# Patient Record
Sex: MALE | Race: WHITE | Employment: OTHER | URBAN - METROPOLITAN AREA
[De-identification: names, ages, dates, MRNs, and addresses within clinical notes are randomized per-mention and may not be internally consistent; named-entity substitution may affect disease eponyms.]

---

## 2020-07-20 PROBLEM — E66.01 SEVERE OBESITY (HCC): Status: ACTIVE | Noted: 2020-07-20

## 2021-04-03 ENCOUNTER — HOSPITAL ENCOUNTER (EMERGENCY)
Age: 76
Discharge: HOME | End: 2021-04-03
Attending: EMERGENCY MEDICINE
Payer: MEDICARE

## 2021-04-03 VITALS
HEART RATE: 79 BPM | DIASTOLIC BLOOD PRESSURE: 71 MMHG | WEIGHT: 248 LBS | SYSTOLIC BLOOD PRESSURE: 149 MMHG | TEMPERATURE: 97.8 F | HEIGHT: 70 IN | BODY MASS INDEX: 35.5 KG/M2 | RESPIRATION RATE: 18 BRPM | OXYGEN SATURATION: 94 %

## 2021-04-03 DIAGNOSIS — T78.3XXA ANGIOEDEMA, INITIAL ENCOUNTER: Primary | ICD-10-CM

## 2021-04-03 LAB
ABO + RH BLD: NORMAL
BLOOD GROUP ANTIBODIES SERPL: NORMAL
SPECIMEN EXP DATE BLD: NORMAL

## 2021-04-03 PROCEDURE — 74011000250 HC RX REV CODE- 250: Performed by: EMERGENCY MEDICINE

## 2021-04-03 PROCEDURE — P9059 PLASMA, FRZ BETWEEN 8-24HOUR: HCPCS

## 2021-04-03 PROCEDURE — 96375 TX/PRO/DX INJ NEW DRUG ADDON: CPT

## 2021-04-03 PROCEDURE — 36430 TRANSFUSION BLD/BLD COMPNT: CPT

## 2021-04-03 PROCEDURE — 74011250636 HC RX REV CODE- 250/636: Performed by: EMERGENCY MEDICINE

## 2021-04-03 PROCEDURE — 99284 EMERGENCY DEPT VISIT MOD MDM: CPT

## 2021-04-03 PROCEDURE — 86901 BLOOD TYPING SEROLOGIC RH(D): CPT

## 2021-04-03 PROCEDURE — 96374 THER/PROPH/DIAG INJ IV PUSH: CPT

## 2021-04-03 RX ORDER — SODIUM CHLORIDE 9 MG/ML
250 INJECTION, SOLUTION INTRAVENOUS AS NEEDED
Status: DISCONTINUED | OUTPATIENT
Start: 2021-04-03 | End: 2021-04-03 | Stop reason: HOSPADM

## 2021-04-03 RX ORDER — DEXAMETHASONE SODIUM PHOSPHATE 100 MG/10ML
10 INJECTION INTRAMUSCULAR; INTRAVENOUS
Status: COMPLETED | OUTPATIENT
Start: 2021-04-03 | End: 2021-04-03

## 2021-04-03 RX ORDER — DIPHENHYDRAMINE HYDROCHLORIDE 50 MG/ML
50 INJECTION, SOLUTION INTRAMUSCULAR; INTRAVENOUS
Status: COMPLETED | OUTPATIENT
Start: 2021-04-03 | End: 2021-04-03

## 2021-04-03 RX ADMIN — FAMOTIDINE 20 MG: 10 INJECTION INTRAVENOUS at 02:25

## 2021-04-03 RX ADMIN — DIPHENHYDRAMINE HYDROCHLORIDE 50 MG: 50 INJECTION, SOLUTION INTRAMUSCULAR; INTRAVENOUS at 02:25

## 2021-04-03 RX ADMIN — DEXAMETHASONE SODIUM PHOSPHATE 10 MG: 10 INJECTION INTRAMUSCULAR; INTRAVENOUS at 02:25

## 2021-04-03 NOTE — ED NOTES
I have reviewed discharge instructions with the patient. The patient verbalized understanding. Patient left ED via Discharge Method: ambulatory to Home . Opportunity for questions and clarification provided. Patient given 0 scripts. To continue your aftercare when you leave the hospital, you may receive an automated call from our care team to check in on how you are doing. This is a free service and part of our promise to provide the best care and service to meet your aftercare needs.  If you have questions, or wish to unsubscribe from this service please call [phone]. Thank you for Choosing our [facility] Emergency Department.

## 2021-04-03 NOTE — ED TRIAGE NOTES
Arrives without face mask in place. Reports swelling to tongue onset approx 1 hour pta. Denies swelling to throat on arrival. Denies shortness of breath. Hx of same r/t lisinopril however denies taking now. Tolerating oral secretions on arrival. sats 96% ra.

## 2021-04-03 NOTE — DISCHARGE INSTRUCTIONS
Hold your medications and follow-up with your doctor later today as discussed. Return to the emergency department for any concerns.

## 2021-04-03 NOTE — ED PROVIDER NOTES
[address] [facility] CARRILLO Randolph is a 67 y.o. male seen on 4/3/2021 in the [facility] EMERGENCY DEPT in room ER08/08. Chief Complaint   Patient presents with    Angioedema     HPI: 62-year-old male presented to emergency department with complaints of tongue swelling that began about an hour and a half prior to arrival.  Patient states that he was asleep when he woke up and noticed that his tongue was swollen. Patient states that this happened once before in 2016 when he was on lisinopril. He has no longer taking lisinopril. He has not had any new medications or food exposures. He denies any difficulty breathing. He has having a difficult time swallowing as his tongue is protruding and he is drooling. He states that his voice sounds different as well. He denies any chest pain, shortness of breath, fevers, chills, nausea, vomiting, diarrhea or any other concerns. Historian: Patient/Family    REVIEW OF SYSTEMS     Review of Systems   Constitutional: Negative. HENT: Positive for trouble swallowing. Tongue swelling   Respiratory: Negative. Cardiovascular: Negative. Gastrointestinal: Negative. Genitourinary: Negative. Musculoskeletal: Negative. Skin: Negative. Allergic/Immunologic: Negative. Neurological: Negative. Hematological: Negative. Psychiatric/Behavioral: Negative. All other systems reviewed and are negative.       PAST MEDICAL HISTORY     Past Medical History:   Diagnosis Date    Chest pain, unspecified 9/3/2015    Coronary atherosclerosis of native coronary artery 9/3/2015    Essential hypertension, benign 9/3/2015    Other and unspecified hyperlipidemia 9/3/2015    Unstable Angina, Acute Coronary Syndrome 9/3/2015     Past Surgical History:   Procedure Laterality Date    HX HEART CATHETERIZATION Left 3/24/2005    HX HEART CATHETERIZATION Left 11/24/2004    HX PTCA  11/2004     Social History     Socioeconomic History    Marital status:      Spouse name: Not on file    Number of children: Not on file    Years of education: Not on file    Highest education level: Not on file   Tobacco Use    Smoking status: Former Smoker    Smokeless tobacco: Never Used     Prior to Admission Medications   Prescriptions Last Dose Informant Patient Reported? Taking? amLODIPine (NORVASC) 10 mg tablet   No No   Sig: TAKE ONE TABLET BY MOUTH ONCE DAILY   aspirin delayed-release 81 mg tablet   Yes No   Sig: Take  by mouth daily. atorvastatin (LIPITOR) 80 mg tablet   No No   Sig: Take 1 Tab by mouth daily. losartan (COZAAR) 50 mg tablet   Yes No   Sig: Take 50 mg by mouth daily. metoprolol succinate (TOPROL-XL) 50 mg XL tablet   No No   Sig: TAKE ONE TABLET BY MOUTH ONCE DAILY   omeprazole (PRILOSEC) 20 mg capsule   No No   Sig: Take 1 Cap by mouth daily. Facility-Administered Medications: None     Allergies   Allergen Reactions    Altace [Ramipril] Unknown (comments)    Lisinopril Angioedema        PHYSICAL EXAM       Vitals:    04/03/21 0500 04/03/21 0515 04/03/21 0530 04/03/21 0536   BP: (!) 155/78 (!) 150/74 (!) 157/77 (!) 162/82   Pulse: 73 72 74 74   Resp:       Temp:    97.8 °F (36.6 °C)   SpO2: 96% 95% 94% 94%    Vital signs were reviewed. Physical Exam  Vitals signs and nursing note reviewed. Constitutional:       General: He is in acute distress (Drooling). Appearance: He is not ill-appearing or toxic-appearing. HENT:      Head: Normocephalic and atraumatic. Nose: Nose normal.      Mouth/Throat:      Mouth: Mucous membranes are moist.      Comments: Diffuse significant swelling of tongue without posterior pharynx edema. Mild tongue protrusion. Slightly drooling. Eyes:      Extraocular Movements: Extraocular movements intact. Pupils: Pupils are equal, round, and reactive to light. Neck:      Musculoskeletal: Normal range of motion. Cardiovascular:      Rate and Rhythm: Regular rhythm. Tachycardia present. Pulses: Normal pulses. Heart sounds: Normal heart sounds. Pulmonary:      Effort: Pulmonary effort is normal.      Breath sounds: Normal breath sounds. Abdominal:      Palpations: Abdomen is soft. Tenderness: There is no abdominal tenderness. Musculoskeletal: Normal range of motion. Skin:     General: Skin is warm and dry. Neurological:      General: No focal deficit present. Mental Status: He is alert and oriented to person, place, and time. Psychiatric:         Mood and Affect: Mood normal.         Behavior: Behavior normal.         Thought Content: Thought content normal.         Judgment: Judgment normal.          MEDICAL DECISION MAKING     ED Course:    Recent Results (from the past 8 hour(s))   PLASMA, ALLOCATE    Collection Time: 04/03/21  2:15 AM   Result Value Ref Range    Unit number [id number]     Blood component type FP 24h, Thaw     Unit division 00     Status of unit ISSUED    TYPE & SCREEN    Collection Time: 04/03/21  2:23 AM   Result Value Ref Range    Crossmatch Expiration 04/06/2021,2359     ABO/Rh(D) A POSITIVE     Antibody screen NEG      No results found. ED Course as of Apr 03 0546   Sat Apr 03, 2021   0310 Patient with no worsening of his tongue swelling at this time. Awaiting FFP. [JL]   [id number] Patient with near complete resolution of his tongue swelling. Patient will hold his medications and will follow up with his family doctor today to discuss alternative medicines. [JL]      ED Course User Index  [JL] DO DREA Mora  Number of Diagnoses or Management Options  Angioedema, initial encounter  Diagnosis management comments: 41-year-old male presented to emergency department with acute onset of significant tongue swelling consistent with angioedema. Patient with complete resolution of his symptoms after FFP.   It is unclear what the etiology of patient's angioedema is therefore he will hold his medications and follow-up with his family doctor later today. He will return to the emergency department for any concerns. Patient Progress  Patient progress: improved        Disposition:  Discharged  Diagnosis:     ICD-10-CM ICD-9-CM   1. Angioedema, initial encounter  T78. 3XXA 995.1     ____________________________________________________________________  A portion of this note was generated using voice recognition dictation software. While the note has been reviewed for accuracy, please note certain words and phrases may not be transcribed as intended and some grammatical and/or typographical errors may be present.

## 2021-04-04 LAB
BLD PROD TYP BPU: NORMAL
BPU ID: NORMAL
STATUS OF UNIT,%ST: NORMAL
UNIT DIVISION, %UDIV: 0

## 2022-03-20 PROBLEM — E66.01 SEVERE OBESITY (HCC): Status: ACTIVE | Noted: 2020-07-20

## 2025-06-25 NOTE — PROGRESS NOTES
[facility] CARDIOLOGY History & Physical                 Reason for Visit: Palpitations    Subjective:     Patient is a 80 y.o. male with a PMH of chronic HFpEF, atrial fibrillation, CAD status post PCI, hypertension and hyperlipidemia who presents as a referral for palpitations.  The patient denies angina.  He reports \"fluttering\" since last Thursday in his ears.  He denies palpitations that he would feel in the chest.  He has chronic VILLANUEVA when walking \"a long ways\" for the last 2 to 3 years.  He will have VILLANUEVA walking from the clinic room to the parking lot for instance.  He goes fishing for a hobby 3 times a week \"if we are rose marie\".  He denies melena and BRBPR.  He declined a colonoscopy last year when his Cologuard turned out to be positive in November 2024.  The procedure was not in line with his goals of care.  He denies tobacco use.  His last known sinus rhythm was in July 2020 when he had an ECG.  He does not have a TTE readily apparent in the EMR.    Past Medical History:   Diagnosis Date    ACS (acute coronary syndrome) (HCC) 9/3/2015    Chest pain, unspecified 9/3/2015    Coronary atherosclerosis of native coronary artery 9/3/2015    Essential hypertension, benign 9/3/2015    Other and unspecified hyperlipidemia 9/3/2015      Past Surgical History:   Procedure Laterality Date    CARDIAC CATHETERIZATION Left 11/24/2004    CARDIAC CATHETERIZATION Left 3/24/2005    PTCA  11/2004      Family History   Problem Relation Age of Onset    Hypertension Other         Premature coronary disease.      Social History     Tobacco Use    Smoking status: Former    Smokeless tobacco: Never   Substance Use Topics    Alcohol use: Not on file      Allergies   Allergen Reactions    Lisinopril Angioedema    Ramipril Other (See Comments)         ROS:  No obvious pertinent positives on review of systems except for what was outlined above.       Objective:       /62   Pulse 100   Wt 108.9 kg (240 lb)   BMI 34.44 kg/m²     BP

## 2025-06-26 ENCOUNTER — OFFICE VISIT (OUTPATIENT)
Age: 80
End: 2025-06-26
Payer: MEDICARE

## 2025-06-26 VITALS
WEIGHT: 240 LBS | SYSTOLIC BLOOD PRESSURE: 130 MMHG | DIASTOLIC BLOOD PRESSURE: 62 MMHG | BODY MASS INDEX: 34.44 KG/M2 | HEART RATE: 100 BPM

## 2025-06-26 DIAGNOSIS — I50.32 CHRONIC HEART FAILURE WITH PRESERVED EJECTION FRACTION (HFPEF) (HCC): ICD-10-CM

## 2025-06-26 DIAGNOSIS — R60.0 BILATERAL LOWER EXTREMITY EDEMA: ICD-10-CM

## 2025-06-26 DIAGNOSIS — I25.10 CAD IN NATIVE ARTERY: ICD-10-CM

## 2025-06-26 DIAGNOSIS — I48.91 ATRIAL FIBRILLATION, UNSPECIFIED TYPE (HCC): ICD-10-CM

## 2025-06-26 DIAGNOSIS — I48.91 ATRIAL FIBRILLATION, UNSPECIFIED TYPE (HCC): Primary | ICD-10-CM

## 2025-06-26 DIAGNOSIS — E78.5 HYPERLIPIDEMIA, UNSPECIFIED HYPERLIPIDEMIA TYPE: ICD-10-CM

## 2025-06-26 DIAGNOSIS — I10 HYPERTENSION, UNSPECIFIED TYPE: ICD-10-CM

## 2025-06-26 PROBLEM — R06.09 CHRONIC DYSPNEA: Status: ACTIVE | Noted: 2025-06-26

## 2025-06-26 LAB
ANION GAP SERPL CALC-SCNC: 11 MMOL/L (ref 7–16)
BUN SERPL-MCNC: 10 MG/DL (ref 8–23)
CALCIUM SERPL-MCNC: 9.1 MG/DL (ref 8.8–10.2)
CHLORIDE SERPL-SCNC: 100 MMOL/L (ref 98–107)
CO2 SERPL-SCNC: 28 MMOL/L (ref 20–29)
CREAT SERPL-MCNC: 1.09 MG/DL (ref 0.8–1.3)
ERYTHROCYTE [DISTWIDTH] IN BLOOD BY AUTOMATED COUNT: 13.9 % (ref 11.9–14.6)
GLUCOSE SERPL-MCNC: 104 MG/DL (ref 70–99)
HCT VFR BLD AUTO: 48 % (ref 41.1–50.3)
HGB BLD-MCNC: 14.9 G/DL (ref 13.6–17.2)
MAGNESIUM SERPL-MCNC: 2.2 MG/DL (ref 1.8–2.4)
MCH RBC QN AUTO: 27.7 PG (ref 26.1–32.9)
MCHC RBC AUTO-ENTMCNC: 31 G/DL (ref 31.4–35)
MCV RBC AUTO: 89.2 FL (ref 82–102)
NRBC # BLD: 0 K/UL (ref 0–0.2)
PLATELET # BLD AUTO: 214 K/UL (ref 150–450)
PMV BLD AUTO: 9.9 FL (ref 9.4–12.3)
POTASSIUM SERPL-SCNC: 4.2 MMOL/L (ref 3.5–5.1)
RBC # BLD AUTO: 5.38 M/UL (ref 4.23–5.6)
SODIUM SERPL-SCNC: 139 MMOL/L (ref 136–145)
WBC # BLD AUTO: 8.6 K/UL (ref 4.3–11.1)

## 2025-06-26 PROCEDURE — G8428 CUR MEDS NOT DOCUMENT: HCPCS | Performed by: INTERNAL MEDICINE

## 2025-06-26 PROCEDURE — 1126F AMNT PAIN NOTED NONE PRSNT: CPT | Performed by: INTERNAL MEDICINE

## 2025-06-26 PROCEDURE — 1123F ACP DISCUSS/DSCN MKR DOCD: CPT | Performed by: INTERNAL MEDICINE

## 2025-06-26 PROCEDURE — 3078F DIAST BP <80 MM HG: CPT | Performed by: INTERNAL MEDICINE

## 2025-06-26 PROCEDURE — 93000 ELECTROCARDIOGRAM COMPLETE: CPT | Performed by: INTERNAL MEDICINE

## 2025-06-26 PROCEDURE — 4004F PT TOBACCO SCREEN RCVD TLK: CPT | Performed by: INTERNAL MEDICINE

## 2025-06-26 PROCEDURE — 3075F SYST BP GE 130 - 139MM HG: CPT | Performed by: INTERNAL MEDICINE

## 2025-06-26 PROCEDURE — 99204 OFFICE O/P NEW MOD 45 MIN: CPT | Performed by: INTERNAL MEDICINE

## 2025-06-26 PROCEDURE — G8419 CALC BMI OUT NRM PARAM NOF/U: HCPCS | Performed by: INTERNAL MEDICINE

## 2025-06-26 RX ORDER — AMLODIPINE BESYLATE 5 MG/1
5 TABLET ORAL DAILY
Qty: 90 TABLET | Refills: 3 | Status: SHIPPED | OUTPATIENT
Start: 2025-06-26 | End: 2025-06-26 | Stop reason: SDUPTHER

## 2025-06-26 RX ORDER — CELECOXIB 100 MG/1
100 CAPSULE ORAL 2 TIMES DAILY
COMMUNITY
Start: 2024-10-21

## 2025-06-26 RX ORDER — METOPROLOL SUCCINATE 100 MG/1
100 TABLET, EXTENDED RELEASE ORAL DAILY
Qty: 90 TABLET | Refills: 3 | Status: SHIPPED | OUTPATIENT
Start: 2025-06-26 | End: 2025-06-26 | Stop reason: SDUPTHER

## 2025-06-26 RX ORDER — METOPROLOL SUCCINATE 100 MG/1
100 TABLET, EXTENDED RELEASE ORAL DAILY
Qty: 90 TABLET | Refills: 3 | Status: SHIPPED | OUTPATIENT
Start: 2025-06-26

## 2025-06-26 RX ORDER — AMLODIPINE BESYLATE 5 MG/1
5 TABLET ORAL DAILY
Qty: 90 TABLET | Refills: 3 | Status: SHIPPED | OUTPATIENT
Start: 2025-06-26

## 2025-06-26 NOTE — TELEPHONE ENCOUNTER
Requested Prescriptions     Pending Prescriptions Disp Refills    amLODIPine (NORVASC) 5 MG tablet 90 tablet 3     Sig: Take 1 tablet by mouth daily    metoprolol succinate (TOPROL XL) 100 MG extended release tablet 90 tablet 3     Sig: Take 1 tablet by mouth daily

## 2025-06-27 ENCOUNTER — RESULTS FOLLOW-UP (OUTPATIENT)
Age: 80
End: 2025-06-27

## 2025-06-27 DIAGNOSIS — I50.32 CHRONIC HEART FAILURE WITH PRESERVED EJECTION FRACTION (HFPEF) (HCC): Primary | ICD-10-CM

## 2025-06-27 RX ORDER — FUROSEMIDE 20 MG/1
20 TABLET ORAL DAILY
Qty: 90 TABLET | Refills: 3 | Status: SHIPPED | OUTPATIENT
Start: 2025-06-27

## 2025-06-27 NOTE — TELEPHONE ENCOUNTER
----- Message from Dr. Lenin Lujan MD sent at 6/27/2025  8:11 AM EDT -----  Please let the patient know he has a normal renal function and Hgb.  His blood work is acceptable for Eliquis 5 mg BID.  I started the medication.  Furthermore, I started PO Lasix 20 mg daily for CHF.  I ordered a repeat blood draw for one week to re-check his electrolytes and renal function.

## 2025-06-27 NOTE — TELEPHONE ENCOUNTER
Pt.son notified of MD response.He said that  went over all the precautions on Eliquis and Lasix in detail as well as daily weights.30 day free trial offer card called into [facility]

## 2025-06-30 ENCOUNTER — TELEPHONE (OUTPATIENT)
Age: 80
End: 2025-06-30

## 2025-06-30 NOTE — TELEPHONE ENCOUNTER
SonRafal, states patient cannot afford Eliquis, and asks for information on [facility]. Advised Rafal that Eliquis 5 mg BID #168 is currently available from [facility] for $104.64 plus shipping and handling. Instructed Rafal to call [facility] at [phone] to set up account, and I will FAX signed Eliquis Rx to [facility] when signed by Dr Nash, tomorrow. Rafal verbalized understanding.   Printed Eliquis RX, as below, placed on Dr. Nash's desk to be signed.   Requested Prescriptions     Signed Prescriptions Disp Refills    apixaban (ELIQUIS) 5 MG TABS tablet 180 tablet 3     Sig: Take 1 tablet by mouth 2 times daily     Authorizing Provider: YAYA NASH     Ordering User: JOANN BAPTISTE

## 2025-06-30 NOTE — TELEPHONE ENCOUNTER
Pt son said the dr told him if they couldn't afford the rx Eliquis 5mg call bk and we can order for the pt cheaper. He would like a call bk

## 2025-07-01 NOTE — TELEPHONE ENCOUNTER
Rafal states he called [facility], but did not have patient's ID number, which they requested. Instructed Rafal to call [facility] and ask to set up a new account. Advised Rafal that I will FAX Eliquis Rx when signed by Dr. Lujan. Advised Rafal to call with any further questions or concerns. Rafal verbalized understanding.

## 2025-07-01 NOTE — TELEPHONE ENCOUNTER
Requested Prescriptions     Signed Prescriptions Disp Refills    apixaban (ELIQUIS) 5 MG TABS tablet 180 tablet 3     Sig: Take 1 tablet by mouth 2 times daily     Authorizing Provider: YAYA NASH     Ordering User: JOANN BAPTISTE     FAX'd Eliquis Rx, as above, to [facility] at [phone].

## 2025-07-15 ENCOUNTER — TELEPHONE (OUTPATIENT)
Age: 80
End: 2025-07-15

## 2025-07-15 NOTE — TELEPHONE ENCOUNTER
Please let the patient know that the heart function is normal on ECHO. Mild to moderate MR noted, which warrants a surveillance TTE in 2-3 years.

## 2025-07-28 NOTE — PROGRESS NOTES
[facility] CARDIOLOGY Follow Up                 Reason for Visit: Follow-up testing    Subjective:     Patient is a 80 y.o. male with a PMH of chronic HFpEF, persistent atrial fibrillation, CAD status post PCI, hypertension and hyperlipidemia who presents for follow-up.  The patient was last seen in June 2025.  Toprol-XL was increased to 100 mg daily to improve rate control.  Blood work was ordered.  Given the patient's advanced age, possibly long-standing history of AF, and easily controlled HR, a rate control strategy was pursued pending a TTE.  A TTE was ordered.  He had a TTE in July 2025 that was noted to demonstrate a normal EF and mild to moderate MR.  Lab work was noted to be acceptable for Eliquis 5 mg twice daily.  The medication was started as well as p.o. Lasix 20 mg daily.  Amlodipine was decreased to 5 mg daily in the setting of peripheral edema while attempting to optimize BB.  Aspirin was discontinued in order to mitigate bleeding risk.  Repeat blood work was ordered but was not obtained.  The patient denies chest pain, palpitations, and shortness of breath.    Past Medical History:   Diagnosis Date    ACS (acute coronary syndrome) (HCC) 9/3/2015    Chest pain, unspecified 9/3/2015    Coronary atherosclerosis of native coronary artery 9/3/2015    Essential hypertension, benign 9/3/2015    Other and unspecified hyperlipidemia 9/3/2015      Past Surgical History:   Procedure Laterality Date    CARDIAC CATHETERIZATION Left 11/24/2004    CARDIAC CATHETERIZATION Left 3/24/2005    PTCA  11/2004      Family History   Problem Relation Age of Onset    Hypertension Other         Premature coronary disease.      Social History     Tobacco Use    Smoking status: Former    Smokeless tobacco: Never   Substance Use Topics    Alcohol use: Not on file      Allergies   Allergen Reactions    Lisinopril Angioedema    Ramipril Other (See Comments)         ROS:  No obvious pertinent positives on review of systems except for  85

## 2025-07-30 ENCOUNTER — OFFICE VISIT (OUTPATIENT)
Age: 80
End: 2025-07-30
Payer: MEDICARE

## 2025-07-30 VITALS
SYSTOLIC BLOOD PRESSURE: 108 MMHG | HEIGHT: 69 IN | HEART RATE: 84 BPM | DIASTOLIC BLOOD PRESSURE: 80 MMHG | BODY MASS INDEX: 34.1 KG/M2 | WEIGHT: 230.2 LBS

## 2025-07-30 DIAGNOSIS — I10 HYPERTENSION, UNSPECIFIED TYPE: ICD-10-CM

## 2025-07-30 DIAGNOSIS — I25.10 CAD IN NATIVE ARTERY: ICD-10-CM

## 2025-07-30 DIAGNOSIS — I50.32 CHRONIC HEART FAILURE WITH PRESERVED EJECTION FRACTION (HFPEF) (HCC): ICD-10-CM

## 2025-07-30 DIAGNOSIS — I48.19 PERSISTENT ATRIAL FIBRILLATION (HCC): ICD-10-CM

## 2025-07-30 DIAGNOSIS — I50.32 CHRONIC HEART FAILURE WITH PRESERVED EJECTION FRACTION (HFPEF) (HCC): Primary | ICD-10-CM

## 2025-07-30 DIAGNOSIS — I34.0 MITRAL VALVE INSUFFICIENCY, UNSPECIFIED ETIOLOGY: ICD-10-CM

## 2025-07-30 DIAGNOSIS — E78.5 HYPERLIPIDEMIA, UNSPECIFIED HYPERLIPIDEMIA TYPE: ICD-10-CM

## 2025-07-30 LAB
ANION GAP SERPL CALC-SCNC: 14 MMOL/L (ref 7–16)
BUN SERPL-MCNC: 12 MG/DL (ref 8–23)
CALCIUM SERPL-MCNC: 9.3 MG/DL (ref 8.8–10.2)
CHLORIDE SERPL-SCNC: 100 MMOL/L (ref 98–107)
CO2 SERPL-SCNC: 27 MMOL/L (ref 20–29)
CREAT SERPL-MCNC: 1.06 MG/DL (ref 0.8–1.3)
GLUCOSE SERPL-MCNC: 109 MG/DL (ref 70–99)
POTASSIUM SERPL-SCNC: 4.1 MMOL/L (ref 3.5–5.1)
SODIUM SERPL-SCNC: 141 MMOL/L (ref 136–145)

## 2025-07-30 PROCEDURE — 1126F AMNT PAIN NOTED NONE PRSNT: CPT | Performed by: INTERNAL MEDICINE

## 2025-07-30 PROCEDURE — 93000 ELECTROCARDIOGRAM COMPLETE: CPT | Performed by: INTERNAL MEDICINE

## 2025-07-30 PROCEDURE — G8428 CUR MEDS NOT DOCUMENT: HCPCS | Performed by: INTERNAL MEDICINE

## 2025-07-30 PROCEDURE — G8417 CALC BMI ABV UP PARAM F/U: HCPCS | Performed by: INTERNAL MEDICINE

## 2025-07-30 PROCEDURE — 1036F TOBACCO NON-USER: CPT | Performed by: INTERNAL MEDICINE

## 2025-07-30 PROCEDURE — 1123F ACP DISCUSS/DSCN MKR DOCD: CPT | Performed by: INTERNAL MEDICINE

## 2025-07-30 PROCEDURE — 99214 OFFICE O/P EST MOD 30 MIN: CPT | Performed by: INTERNAL MEDICINE

## 2025-07-30 PROCEDURE — 3074F SYST BP LT 130 MM HG: CPT | Performed by: INTERNAL MEDICINE

## 2025-07-30 PROCEDURE — 3079F DIAST BP 80-89 MM HG: CPT | Performed by: INTERNAL MEDICINE

## 2025-07-31 ENCOUNTER — RESULTS FOLLOW-UP (OUTPATIENT)
Age: 80
End: 2025-07-31

## 2025-07-31 NOTE — TELEPHONE ENCOUNTER
07/30/2025 - Results: [place], Bs Incoming [place] W/[employer] and Lenin Lujan MD  (Newest Message First)            Lenin Lujan MD to [facility] Cardiology Triage  (Selected Message)      7/31/25  8:22 AM  Result Note  Please let the patient know that his kidney function is normal.